# Patient Record
Sex: FEMALE | Race: WHITE | Employment: FULL TIME | URBAN - METROPOLITAN AREA
[De-identification: names, ages, dates, MRNs, and addresses within clinical notes are randomized per-mention and may not be internally consistent; named-entity substitution may affect disease eponyms.]

---

## 2022-10-31 ENCOUNTER — OFFICE VISIT (OUTPATIENT)
Dept: OCCUPATIONAL MEDICINE | Age: 57
End: 2022-10-31

## 2022-10-31 VITALS
SYSTOLIC BLOOD PRESSURE: 100 MMHG | TEMPERATURE: 100.5 F | HEART RATE: 125 BPM | RESPIRATION RATE: 18 BRPM | DIASTOLIC BLOOD PRESSURE: 70 MMHG | OXYGEN SATURATION: 99 %

## 2022-10-31 DIAGNOSIS — R68.89 FLU-LIKE SYMPTOMS: Primary | ICD-10-CM

## 2022-10-31 DIAGNOSIS — J01.00 ACUTE NON-RECURRENT MAXILLARY SINUSITIS: ICD-10-CM

## 2022-10-31 PROBLEM — M54.2 CERVICALGIA: Status: ACTIVE | Noted: 2022-05-24

## 2022-10-31 PROBLEM — R30.0 DYSURIA: Status: ACTIVE | Noted: 2022-05-24

## 2022-10-31 PROBLEM — U07.1 COVID-19: Status: ACTIVE | Noted: 2022-10-05

## 2022-10-31 PROBLEM — M54.50 ACUTE MIDLINE LOW BACK PAIN WITHOUT SCIATICA: Status: ACTIVE | Noted: 2022-05-24

## 2022-10-31 PROBLEM — K59.00 CONSTIPATION: Status: ACTIVE | Noted: 2017-08-16

## 2022-10-31 LAB
INFLUENZA A ANTIGEN, POC: NEGATIVE
INFLUENZA B ANTIGEN, POC: NEGATIVE
SARS-COV-2 RNA, POC: NEGATIVE

## 2022-10-31 RX ORDER — AMOXICILLIN AND CLAVULANATE POTASSIUM 875; 125 MG/1; MG/1
1 TABLET, FILM COATED ORAL 2 TIMES DAILY
Qty: 14 TABLET | Refills: 0 | Status: SHIPPED | OUTPATIENT
Start: 2022-10-31 | End: 2022-11-07

## 2022-10-31 ASSESSMENT — ENCOUNTER SYMPTOMS
COUGH: 1
SORE THROAT: 0
SINUS PRESSURE: 1
RHINORRHEA: 1
WHEEZING: 0
SINUS PAIN: 1
SINUS COMPLAINT: 1
CHEST TIGHTNESS: 0
TROUBLE SWALLOWING: 0
SHORTNESS OF BREATH: 0
SWOLLEN GLANDS: 1
VOICE CHANGE: 1
HOARSE VOICE: 1

## 2022-10-31 NOTE — PROGRESS NOTES
PROGRESS NOTE    SUBJECTIVE:   Latasha Garcia is a 61 y.o. female who complains of coryza, congestion, nasal blockage, post nasal drip, dry cough, headache, bilateral sinus pain, green nasal discharge, fever, chills and hoarseness for 3 days. She denies a history of chills, fevers, shortness of breath, weakness, weight loss, and wheezing and denies a history of asthma. Patient does smoke cigarettes. Pt states she had COVID at the beginning of last month and started feeling better about two weeks ago. Pt did try ibuprofen for her headache which helped some. Sinus Problem  This is a new problem. The current episode started in the past 7 days. The problem has been rapidly worsening since onset. The maximum temperature recorded prior to her arrival was 100.4 - 100.9 F. The fever has been present for 1 to 2 days. Her pain is at a severity of 5/10. The pain is moderate. Associated symptoms include chills, congestion, coughing, headaches, a hoarse voice, sinus pressure, sneezing and swollen glands. Pertinent negatives include no diaphoresis, ear pain, neck pain, shortness of breath or sore throat. Treatments tried: Ibuprofen. The treatment provided mild relief. Current Outpatient Medications   Medication Sig Dispense Refill    amoxicillin-clavulanate (AUGMENTIN) 875-125 MG per tablet Take 1 tablet by mouth 2 times daily for 7 days 14 tablet 0     No current facility-administered medications for this visit. No Known Allergies  Social History     Tobacco Use    Smoking status: Every Day     Types: Cigarettes    Smokeless tobacco: Never   Substance Use Topics    Alcohol use: Not on file       Review of Systems   Constitutional:  Positive for chills, fatigue and fever. Negative for activity change, appetite change, diaphoresis and unexpected weight change. HENT:  Positive for congestion, hoarse voice, postnasal drip, rhinorrhea, sinus pressure, sinus pain, sneezing and voice change.  Negative for ear pain, sore throat, tinnitus and trouble swallowing. Respiratory:  Positive for cough. Negative for chest tightness, shortness of breath and wheezing. Musculoskeletal:  Negative for myalgias and neck pain. Neurological:  Positive for headaches. OBJECTIVE:  /70 (Site: Left Upper Arm)   Pulse (!) 125   Temp (!) 100.5 °F (38.1 °C) (Oral)   Resp 18   SpO2 99%      Physical Exam  Constitutional:       General: She is awake. Appearance: Normal appearance. She is well-developed and well-groomed. HENT:      Head: Normocephalic and atraumatic. Right Ear: Hearing, tympanic membrane, ear canal and external ear normal.      Left Ear: Hearing, tympanic membrane, ear canal and external ear normal.      Nose: Congestion and rhinorrhea present. Rhinorrhea is purulent. Right Turbinates: Enlarged. Left Turbinates: Enlarged. Right Sinus: Maxillary sinus tenderness and frontal sinus tenderness present. Left Sinus: Maxillary sinus tenderness and frontal sinus tenderness present. Mouth/Throat:      Lips: Pink. Mouth: Mucous membranes are moist.      Pharynx: Uvula midline. Posterior oropharyngeal erythema present. Tonsils: No tonsillar exudate. Cardiovascular:      Rate and Rhythm: Regular rhythm. Tachycardia present. Pulses:           Radial pulses are 2+ on the right side and 2+ on the left side. Heart sounds: S1 normal and S2 normal.   Pulmonary:      Effort: Pulmonary effort is normal.      Breath sounds: Normal breath sounds and air entry. Lymphadenopathy:      Head:      Right side of head: Submandibular adenopathy present. No posterior auricular adenopathy. Left side of head: Submandibular adenopathy present. No posterior auricular adenopathy. Neurological:      Mental Status: She is alert. Psychiatric:         Behavior: Behavior is cooperative.      Recent Results (from the past 4 hour(s))   AMB POC SARS-COV-2 AND INFLUENZA A/B    Collection Time: 10/31/22  9:14 AM   Result Value Ref Range    SARS-COV-2 RNA, POC Negative     Influenza A Antigen, POC Negative Negative    Influenza B Antigen, POC Negative Negative       ASSESSMENT and PLAN    Laila Ingram was seen today for sinus problem. Diagnoses and all orders for this visit:    Flu-like symptoms  -     AMB POC SARS-COV-2 AND INFLUENZA A/B    Acute non-recurrent maxillary sinusitis  -     amoxicillin-clavulanate (AUGMENTIN) 875-125 MG per tablet; Take 1 tablet by mouth 2 times daily for 7 days    Flu and COVID negative. Patient Education:  Increase fluid intake. Hot steam showers for nasal congestion. Saline irrigation for nasal rinses. Sleep with HOB elevated. Avoid smoking, caffeine, and alcohol. May use ibuprofen, acetaminophen, or ASA for sinus/head pain or discomfort. For sinus congestion: May use Afrin, limited to only 3 day use. May use Sudafed only if not on antihypertensive. Follow up with PCP or here in 5 days if symptoms not any better or worse. Seek ED care of developing severe headache, high fever, chest pain, or SOB. Patient voices understanding and agrees with the plan of care described above. Counseled on benefits of having a primary care provider which includes, but is not limited to, continuity of care and having a medical home when concerns arise. Also enforced that onsite clinic policy states that we are not to take the place of a primary care provider, pt verbalized understanding. SEs and risk vs benefits associated with medications prescribed discussed with patient who verbalized understanding. Pt verbalized understanding and agreement with plan of care. RTC for persisting/worsening symptoms or new complaints that arise.  Discussed signs and symptoms that would warrant immediate evaluation including, but not limited to HA, blurred vision, speech disturbance, difficulty with ambulation/gait, numbness, tingling, weakness, syncope, chest pain, or shortness of breath. I have reviewed the patient's medication list, past medical, family, social, and surgical history in detail and updated the patient record appropriately.     HETAL Lovelace NP

## 2024-01-22 ENCOUNTER — OFFICE VISIT (OUTPATIENT)
Age: 59
End: 2024-01-22

## 2024-01-22 VITALS
SYSTOLIC BLOOD PRESSURE: 128 MMHG | TEMPERATURE: 97.8 F | OXYGEN SATURATION: 99 % | HEIGHT: 67 IN | HEART RATE: 75 BPM | RESPIRATION RATE: 16 BRPM | BODY MASS INDEX: 31.86 KG/M2 | WEIGHT: 203 LBS | DIASTOLIC BLOOD PRESSURE: 84 MMHG

## 2024-01-22 DIAGNOSIS — J06.9 VIRAL URI: ICD-10-CM

## 2024-01-22 DIAGNOSIS — R09.89 RUNNY NOSE: Primary | ICD-10-CM

## 2024-01-22 RX ORDER — FLUTICASONE PROPIONATE 50 MCG
2 SPRAY, SUSPENSION (ML) NASAL DAILY
Qty: 16 G | Refills: 0 | Status: SHIPPED | OUTPATIENT
Start: 2024-01-22

## 2024-01-22 ASSESSMENT — ENCOUNTER SYMPTOMS
VOMITING: 0
EYE REDNESS: 0
SINUS PAIN: 0
CHEST TIGHTNESS: 0
SINUS PRESSURE: 1
TROUBLE SWALLOWING: 0
BLOOD IN STOOL: 0
SHORTNESS OF BREATH: 0
PHOTOPHOBIA: 0
EYE DISCHARGE: 1
DIARRHEA: 0
SORE THROAT: 0
COUGH: 0
RHINORRHEA: 1
ABDOMINAL PAIN: 0
EYE PAIN: 0

## 2024-01-22 NOTE — PROGRESS NOTES
PROGRESS NOTE    SUBJECTIVE     Elizabeth Marquez is a 58 y.o. female seen for:    Chief Complaint    Headache; Nasal Congestion         Patient presents to the [facility] for sneezing, runny nose, fatigue, and headache, especially behind the eyes. Patient's symptoms started on Friday and got worse over the weekend and then have improved somewhat today. Patient took ibuprofen 400 mg today for a headache and took a few doses over the weekend today. Patient has not taken any allergy medicine as she doesn't usually experience symptoms from seasonal allergies. Patient has not been using Flonase nasal spray either.     Patient sees her primary care provider (Dr. Bonilla or Sumaya Orosco NP at [facility]) regularly and was last seen on 9/12/23 for a viral upper respiratory infection. Patient has a history of smoking cigarettes and currently smokes about 1/2 PPD. Patient denies recent bronchitis infections or coughing but does get sinus infections periodically. Patient has not had blood work done in the last few years.         Current Outpatient Medications   Medication Sig Dispense Refill    fluticasone (FLONASE) 50 MCG/ACT nasal spray 2 sprays by Each Nostril route daily 16 g 0     No current facility-administered medications for this visit.      No Known Allergies  Past Medical History:   Diagnosis Date    Smoker     Smokes about 1/2 PPD; had quit for 5 years at one time     Past Surgical History:   Procedure Laterality Date    TUBAL LIGATION         Social History     Tobacco Use    Smoking status: Every Day     Current packs/day: 0.50     Types: Cigarettes    Smokeless tobacco: Never   Vaping Use    Vaping Use: Never used   Substance Use Topics    Alcohol use: Not Currently        No family history on file.    Review of Systems   Constitutional:  Negative for chills, diaphoresis, fatigue and fever.   HENT:  Positive for congestion, rhinorrhea and sinus pressure.

## 2024-01-24 ENCOUNTER — TELEPHONE (OUTPATIENT)
Age: 59
End: 2024-01-24

## 2024-01-24 NOTE — TELEPHONE ENCOUNTER
Called patient to follow up on symptoms. Left message for patient to follow up with the [facility] and [facility] at [phone] if she needs anything else.     HETAL Bains - NP